# Patient Record
Sex: FEMALE | Race: BLACK OR AFRICAN AMERICAN | NOT HISPANIC OR LATINO | URBAN - METROPOLITAN AREA
[De-identification: names, ages, dates, MRNs, and addresses within clinical notes are randomized per-mention and may not be internally consistent; named-entity substitution may affect disease eponyms.]

---

## 2021-03-08 ENCOUNTER — OFFICE VISIT (OUTPATIENT)
Dept: URBAN - METROPOLITAN AREA CLINIC 27 | Facility: CLINIC | Age: 51
End: 2021-03-08

## 2021-03-08 PROBLEM — 236060008 SYMPTOMATIC DISORDERS OF THE GASTROINTESTINAL TRACT: Status: ACTIVE | Noted: 2021-03-08

## 2021-03-08 PROBLEM — 266435005 GASTRO-ESOPHAGEAL REFLUX DISEASE WITHOUT ESOPHAGITIS: Status: ACTIVE | Noted: 2021-03-08

## 2021-03-08 PROBLEM — 59621000 ESSENTIAL HYPERTENSION: Status: ACTIVE | Noted: 2021-03-08

## 2021-03-08 PROBLEM — 275978004 SCREENING FOR MALIGNANT NEOPLASM OF COLON: Status: ACTIVE | Noted: 2021-03-08

## 2021-03-08 PROBLEM — 266464001 HEMORRHAGE OF RECTUM AND ANUS: Status: ACTIVE | Noted: 2021-03-08

## 2021-03-08 PROBLEM — 238131007 OVERWEIGHT: Status: ACTIVE | Noted: 2021-03-08

## 2021-03-08 PROBLEM — 429006005 FAMILY HISTORY OF MALIGNANT NEOPLASM OF GASTROINTESTINAL TRACT: Status: ACTIVE | Noted: 2021-03-08

## 2021-03-16 ENCOUNTER — OFFICE VISIT (OUTPATIENT)
Dept: URBAN - METROPOLITAN AREA SURGERY CENTER 7 | Facility: SURGERY CENTER | Age: 51
End: 2021-03-16

## 2022-04-30 ENCOUNTER — TELEPHONE ENCOUNTER (OUTPATIENT)
Dept: URBAN - METROPOLITAN AREA CLINIC 121 | Facility: CLINIC | Age: 52
End: 2022-04-30

## 2022-05-01 ENCOUNTER — TELEPHONE ENCOUNTER (OUTPATIENT)
Dept: URBAN - METROPOLITAN AREA CLINIC 121 | Facility: CLINIC | Age: 52
End: 2022-05-01

## 2022-05-01 RX ORDER — FAMOTIDINE 20 MG/1
TABLET ORAL
Status: ACTIVE | COMMUNITY
Start: 2021-03-08

## 2022-05-01 RX ORDER — AMLODIPINE BESYLATE 10 MG/1
TABLET ORAL
Status: ACTIVE | COMMUNITY
Start: 2021-03-08

## 2023-01-12 ENCOUNTER — LAB OUTSIDE AN ENCOUNTER (OUTPATIENT)
Dept: URBAN - METROPOLITAN AREA CLINIC 27 | Facility: CLINIC | Age: 53
End: 2023-01-12

## 2023-01-12 ENCOUNTER — CLAIMS CREATED FROM THE CLAIM WINDOW (OUTPATIENT)
Dept: URBAN - METROPOLITAN AREA CLINIC 27 | Facility: CLINIC | Age: 53
End: 2023-01-12
Payer: COMMERCIAL

## 2023-01-12 ENCOUNTER — TELEPHONE ENCOUNTER (OUTPATIENT)
Dept: URBAN - METROPOLITAN AREA CLINIC 27 | Facility: CLINIC | Age: 53
End: 2023-01-12

## 2023-01-12 VITALS
HEART RATE: 96 BPM | SYSTOLIC BLOOD PRESSURE: 129 MMHG | WEIGHT: 160 LBS | DIASTOLIC BLOOD PRESSURE: 93 MMHG | BODY MASS INDEX: 25.71 KG/M2 | HEIGHT: 66 IN

## 2023-01-12 DIAGNOSIS — R11.2 NAUSEA & VOMITING: ICD-10-CM

## 2023-01-12 DIAGNOSIS — R68.81 EARLY SATIETY: ICD-10-CM

## 2023-01-12 DIAGNOSIS — Z78.9 ALCOHOL USE: ICD-10-CM

## 2023-01-12 DIAGNOSIS — F41.9 ANXIETY: ICD-10-CM

## 2023-01-12 DIAGNOSIS — K21.9 GERD: ICD-10-CM

## 2023-01-12 DIAGNOSIS — I10 HYPERTENSION: ICD-10-CM

## 2023-01-12 DIAGNOSIS — R19.5 LOOSE STOOLS: ICD-10-CM

## 2023-01-12 PROBLEM — 38341003 HYPERTENSION: Status: ACTIVE | Noted: 2023-01-12

## 2023-01-12 PROBLEM — 48694002 ANXIETY: Status: ACTIVE | Noted: 2023-01-12

## 2023-01-12 PROBLEM — 235595009 GASTROESOPHAGEAL REFLUX DISEASE: Status: ACTIVE | Noted: 2023-01-12

## 2023-01-12 PROCEDURE — 99214 OFFICE O/P EST MOD 30 MIN: CPT | Performed by: INTERNAL MEDICINE

## 2023-01-12 PROCEDURE — 99204 OFFICE O/P NEW MOD 45 MIN: CPT | Performed by: INTERNAL MEDICINE

## 2023-01-12 RX ORDER — AMLODIPINE BESYLATE 10 MG/1
TABLET ORAL
Status: ACTIVE | COMMUNITY
Start: 2021-03-08

## 2023-01-12 RX ORDER — PANTOPRAZOLE SODIUM 40 MG/1
1 TABLET TABLET, DELAYED RELEASE ORAL ONCE A DAY
Qty: 90 | Refills: 3 | OUTPATIENT
Start: 2023-01-12

## 2023-01-12 RX ORDER — ONDANSETRON HYDROCHLORIDE 8 MG/1
TAKE 1/2 OR 1 TABLET TABLET, FILM COATED ORAL
Qty: 60 | Refills: 2 | OUTPATIENT
Start: 2023-01-12

## 2023-01-12 NOTE — HPI-TODAY'S VISIT:
Patient here with multiple GI complaints that has been present for at least the past year.  She has suboptimally controlled reflux.  She is not currently taking anything for this aside from OTC omeprazole, which she uses at least a few times per week.  She is somewhat adherent to an antireflux regimen.  She has intermittent nausea and vomiting and an active "gag reflex".  She denies any triggers for the nausea and vomiting.  She does not use any antiemetics.  She does have intermittent early satiety.  She is usually careful to eat frequent small meals.  She typically has 1 loose stool per day.  She does not take a probiotic or use a fiber supplement.  She denies any specific food sensitivities.  She has lost 30 pounds over the past 3 years to which she attributes "more activity".  She does have suboptimally-controlled anxiety.  She has not had any recent labs or imaging studies.  She does drink 2-3 alcoholic beverages per day.  She last underwent colonoscopy in early 2021; 2 benign polyps were removed.  She has not had a prior upper endoscopy.  Her maternal grandmother had colon cancer at age 90.

## 2023-01-13 LAB
A/G RATIO: 1.3
ABSOLUTE BASOPHILS: 90
ABSOLUTE EOSINOPHILS: 120
ABSOLUTE LYMPHOCYTES: 1250
ABSOLUTE MONOCYTES: 730
ABSOLUTE NEUTROPHILS: 2810
ALBUMIN: 3.9
ALKALINE PHOSPHATASE: 163
ALT (SGPT): 72
AMYLASE: 23
AST (SGOT): 226
BASOPHILS: 1.8
BILIRUBIN, TOTAL: 0.8
BUN/CREATININE RATIO: 7
BUN: 5
CALCIUM: 9.5
CARBON DIOXIDE, TOTAL: 26
CHLORIDE: 102
CREATININE: 0.71
EGFR: 103
EOSINOPHILS: 2.4
GLOBULIN, TOTAL: 3.1
GLUCOSE: 110
HEMATOCRIT: 38.9
HEMOGLOBIN: 13.7
LIPASE: 58
LYMPHOCYTES: 25
MCH: 33.3
MCHC: 35.2
MCV: 94.4
MONOCYTES: 14.6
MPV: 10.4
NEUTROPHILS: 56.2
PLATELET COUNT: 362
POTASSIUM: 3.4
PROTEIN, TOTAL: 7
RDW: 19.3
RED BLOOD CELL COUNT: 4.12
SODIUM: 139
TSH: 1.81
WHITE BLOOD CELL COUNT: 5

## 2023-01-19 ENCOUNTER — LAB OUTSIDE AN ENCOUNTER (OUTPATIENT)
Dept: URBAN - METROPOLITAN AREA CLINIC 27 | Facility: CLINIC | Age: 53
End: 2023-01-19

## 2023-01-19 ENCOUNTER — CLAIMS CREATED FROM THE CLAIM WINDOW (OUTPATIENT)
Dept: URBAN - METROPOLITAN AREA CLINIC 4 | Facility: CLINIC | Age: 53
End: 2023-01-19
Payer: COMMERCIAL

## 2023-01-19 ENCOUNTER — CLAIMS CREATED FROM THE CLAIM WINDOW (OUTPATIENT)
Dept: URBAN - METROPOLITAN AREA SURGERY CENTER 7 | Facility: SURGERY CENTER | Age: 53
End: 2023-01-19
Payer: COMMERCIAL

## 2023-01-19 ENCOUNTER — OFFICE VISIT (OUTPATIENT)
Dept: URBAN - METROPOLITAN AREA SURGERY CENTER 7 | Facility: SURGERY CENTER | Age: 53
End: 2023-01-19

## 2023-01-19 DIAGNOSIS — K29.60 ADENOPAPILLOMATOSIS GASTRICA: ICD-10-CM

## 2023-01-19 DIAGNOSIS — K29.60 OTHER GASTRITIS WITHOUT BLEEDING: ICD-10-CM

## 2023-01-19 DIAGNOSIS — R12 BURNING REFLUX: ICD-10-CM

## 2023-01-19 DIAGNOSIS — K31.89 ACQUIRED DEFORMITY OF DUODENUM: ICD-10-CM

## 2023-01-19 DIAGNOSIS — K31.89 OTHER DISEASES OF STOMACH AND DUODENUM: ICD-10-CM

## 2023-01-19 DIAGNOSIS — B96.81 BACTERIAL INFECTION DUE TO H. PYLORI: ICD-10-CM

## 2023-01-19 DIAGNOSIS — K31.89 ACHYLIA: ICD-10-CM

## 2023-01-19 PROCEDURE — 88305 TISSUE EXAM BY PATHOLOGIST: CPT | Performed by: PATHOLOGY

## 2023-01-19 PROCEDURE — 88342 IMHCHEM/IMCYTCHM 1ST ANTB: CPT | Performed by: PATHOLOGY

## 2023-01-19 PROCEDURE — 43239 EGD BIOPSY SINGLE/MULTIPLE: CPT | Performed by: INTERNAL MEDICINE

## 2023-01-19 PROCEDURE — 88341 IMHCHEM/IMCYTCHM EA ADD ANTB: CPT | Performed by: PATHOLOGY

## 2023-01-19 PROCEDURE — G8907 PT DOC NO EVENTS ON DISCHARG: HCPCS | Performed by: INTERNAL MEDICINE

## 2023-01-19 RX ORDER — AMLODIPINE BESYLATE 10 MG/1
TABLET ORAL
Status: ACTIVE | COMMUNITY
Start: 2021-03-08

## 2023-01-19 RX ORDER — PANTOPRAZOLE SODIUM 40 MG/1
1 TABLET TABLET, DELAYED RELEASE ORAL ONCE A DAY
Qty: 90 | Refills: 3 | Status: ACTIVE | COMMUNITY
Start: 2023-01-12

## 2023-01-19 RX ORDER — ONDANSETRON HYDROCHLORIDE 8 MG/1
TAKE 1/2 OR 1 TABLET TABLET, FILM COATED ORAL
Qty: 60 | Refills: 2 | Status: ACTIVE | COMMUNITY
Start: 2023-01-12

## 2023-01-20 LAB
ALBUMIN/GLOBULIN RATIO: 1.3
ALBUMIN: 3.8
ALKALINE PHOSPHATASE: 185
ALT (SGPT): 43
AST (SGOT): 127
BILIRUBIN, DIRECT: 0.4
BILIRUBIN, INDIRECT: 0.7
BILIRUBIN, TOTAL: 1.1
GLOBULIN: 3
HEP A AB, IGM: (no result)
HEPATITIS B SURFACE ANTIGEN: (no result)
HEPATITIS C ANTIBODY: (no result)
INDEX: <0.02
PROTEIN, TOTAL: 6.8

## 2023-03-13 ENCOUNTER — TELEPHONE ENCOUNTER (OUTPATIENT)
Dept: URBAN - METROPOLITAN AREA CLINIC 27 | Facility: CLINIC | Age: 53
End: 2023-03-13

## 2023-03-13 RX ORDER — PANTOPRAZOLE SODIUM 40 MG/1
1 TABLET TABLET, DELAYED RELEASE ORAL TWICE A DAY
Qty: 180 TABLET | Refills: 3
Start: 2023-01-12

## 2023-03-23 ENCOUNTER — TELEPHONE ENCOUNTER (OUTPATIENT)
Dept: URBAN - METROPOLITAN AREA CLINIC 27 | Facility: CLINIC | Age: 53
End: 2023-03-23

## 2023-07-03 ENCOUNTER — TELEPHONE ENCOUNTER (OUTPATIENT)
Dept: URBAN - METROPOLITAN AREA CLINIC 17 | Facility: CLINIC | Age: 53
End: 2023-07-03

## 2023-10-19 ENCOUNTER — CLAIMS CREATED FROM THE CLAIM WINDOW (OUTPATIENT)
Dept: URBAN - METROPOLITAN AREA MEDICAL CENTER 17 | Facility: MEDICAL CENTER | Age: 53
End: 2023-10-19
Payer: COMMERCIAL

## 2023-10-19 DIAGNOSIS — R79.89 ABNORMAL BILIRUBIN TEST: ICD-10-CM

## 2023-10-19 DIAGNOSIS — K70.10 ACUTE ALCOHOLIC HEPATITIS: ICD-10-CM

## 2023-10-19 DIAGNOSIS — K85.20 ACUTE ALCOHOLIC PANCREATITIS: ICD-10-CM

## 2023-10-19 PROCEDURE — 99222 1ST HOSP IP/OBS MODERATE 55: CPT | Performed by: INTERNAL MEDICINE

## 2023-10-19 PROCEDURE — 99254 IP/OBS CNSLTJ NEW/EST MOD 60: CPT | Performed by: INTERNAL MEDICINE

## 2023-10-19 PROCEDURE — G8427 DOCREV CUR MEDS BY ELIG CLIN: HCPCS | Performed by: INTERNAL MEDICINE

## 2023-10-21 ENCOUNTER — P2P PATIENT RECORD (OUTPATIENT)
Age: 53
End: 2023-10-21

## 2023-11-10 ENCOUNTER — OFFICE VISIT (OUTPATIENT)
Dept: URBAN - METROPOLITAN AREA CLINIC 84 | Facility: CLINIC | Age: 53
End: 2023-11-10
Payer: COMMERCIAL

## 2023-11-10 VITALS
TEMPERATURE: 96.9 F | WEIGHT: 141 LBS | BODY MASS INDEX: 22.66 KG/M2 | DIASTOLIC BLOOD PRESSURE: 92 MMHG | HEART RATE: 73 BPM | HEIGHT: 66 IN | SYSTOLIC BLOOD PRESSURE: 141 MMHG

## 2023-11-10 DIAGNOSIS — K70.10 ACUTE ALCOHOLIC HEPATITIS: ICD-10-CM

## 2023-11-10 PROBLEM — 9953008: Status: ACTIVE | Noted: 2023-11-10

## 2023-11-10 PROCEDURE — 99213 OFFICE O/P EST LOW 20 MIN: CPT | Performed by: INTERNAL MEDICINE

## 2023-11-10 RX ORDER — ONDANSETRON HYDROCHLORIDE 8 MG/1
TAKE 1/2 OR 1 TABLET TABLET, FILM COATED ORAL
Qty: 60 | Refills: 2 | Status: ACTIVE | COMMUNITY
Start: 2023-01-12

## 2023-11-10 RX ORDER — PANTOPRAZOLE SODIUM 40 MG/1
1 TABLET TABLET, DELAYED RELEASE ORAL TWICE A DAY
Qty: 180 TABLET | Refills: 3 | Status: ACTIVE | COMMUNITY
Start: 2023-01-12

## 2023-11-10 RX ORDER — AMLODIPINE BESYLATE 10 MG/1
TABLET ORAL
Status: ACTIVE | COMMUNITY
Start: 2021-03-08

## 2023-11-10 RX ORDER — FOLIC ACID 1 MG/1
1 TABLET TABLET ORAL ONCE A DAY
Qty: 30 | Refills: 5 | OUTPATIENT
Start: 2023-11-10 | End: 2024-05-08

## 2023-11-10 RX ORDER — THIAMINE HCL 100 MG
1 TABLET TABLET ORAL ONCE A DAY
Qty: 30 | Refills: 3 | OUTPATIENT
Start: 2023-11-10 | End: 2024-03-09

## 2023-11-10 NOTE — HPI-TODAY'S VISIT:
52 yo pt with etohic hepatitis presents for f/u after hospital discharge for acute etohic hepatis. Pt has abstained from etoh.

## 2023-11-25 LAB
A/G RATIO: 1.2
ALBUMIN: 3.8
ALKALINE PHOSPHATASE: 162
ALT (SGPT): 24
AST (SGOT): 54
BASO (ABSOLUTE): 0.1
BASOS: 1
BILIRUBIN, TOTAL: 0.9
BUN/CREATININE RATIO: 12
BUN: 6
CALCIUM: 9.7
CARBON DIOXIDE, TOTAL: 23
CHLORIDE: 106
CREATININE: 0.52
EGFR: 112
EOS (ABSOLUTE): 0.1
EOS: 1
GLOBULIN, TOTAL: 3.2
GLUCOSE: 96
HEMATOCRIT: 35.1
HEMATOLOGY COMMENTS:: (no result)
HEMOGLOBIN: 11.5
IMMATURE CELLS: (no result)
IMMATURE GRANS (ABS): 0
IMMATURE GRANULOCYTES: 0
LYMPHS (ABSOLUTE): 1.5
LYMPHS: 25
MCH: 32.8
MCHC: 32.8
MCV: 100
MONOCYTES(ABSOLUTE): 0.6
MONOCYTES: 9
NEUTROPHILS (ABSOLUTE): 4.1
NEUTROPHILS: 64
NRBC: (no result)
PLATELETS: 219
POTASSIUM: 4.2
PROTEIN, TOTAL: 7
RBC: 3.51
RDW: 11.7
SODIUM: 143
WBC: 6.3

## 2024-01-02 ENCOUNTER — OFFICE VISIT (OUTPATIENT)
Dept: URBAN - METROPOLITAN AREA CLINIC 84 | Facility: CLINIC | Age: 54
End: 2024-01-02

## 2024-01-02 ENCOUNTER — DASHBOARD ENCOUNTERS (OUTPATIENT)
Age: 54
End: 2024-01-02

## 2024-01-02 RX ORDER — PANTOPRAZOLE SODIUM 40 MG/1
1 TABLET TABLET, DELAYED RELEASE ORAL TWICE A DAY
Qty: 180 TABLET | Refills: 3 | Status: ACTIVE | COMMUNITY
Start: 2023-01-12

## 2024-01-02 RX ORDER — AMLODIPINE BESYLATE 10 MG/1
TABLET ORAL
Status: ACTIVE | COMMUNITY
Start: 2021-03-08

## 2024-01-02 RX ORDER — THIAMINE HCL 100 MG
1 TABLET TABLET ORAL ONCE A DAY
Qty: 30 | Refills: 3 | Status: ACTIVE | COMMUNITY
Start: 2023-11-10 | End: 2024-03-09

## 2024-01-02 RX ORDER — FOLIC ACID 1 MG/1
1 TABLET TABLET ORAL ONCE A DAY
Qty: 30 | Refills: 5 | Status: ACTIVE | COMMUNITY
Start: 2023-11-10 | End: 2024-05-08

## 2024-01-02 RX ORDER — ONDANSETRON HYDROCHLORIDE 8 MG/1
TAKE 1/2 OR 1 TABLET TABLET, FILM COATED ORAL
Qty: 60 | Refills: 2 | Status: ACTIVE | COMMUNITY
Start: 2023-01-12